# Patient Record
Sex: FEMALE | Race: WHITE | NOT HISPANIC OR LATINO | Employment: OTHER | URBAN - METROPOLITAN AREA
[De-identification: names, ages, dates, MRNs, and addresses within clinical notes are randomized per-mention and may not be internally consistent; named-entity substitution may affect disease eponyms.]

---

## 2024-08-06 PROBLEM — I63.9 ACUTE CEREBROVASCULAR ACCIDENT (CVA) DUE TO ISCHEMIA: Status: ACTIVE | Noted: 2024-08-06

## 2024-08-06 PROBLEM — Z71.89 ACP (ADVANCE CARE PLANNING): Status: ACTIVE | Noted: 2024-08-06

## 2024-08-06 PROBLEM — R29.810 WEAKNESS ON RIGHT SIDE OF FACE: Status: ACTIVE | Noted: 2024-08-06

## 2024-08-07 PROBLEM — Z73.6 LIMITATION OF ACTIVITY DUE TO DISABILITY: Status: ACTIVE | Noted: 2024-08-07

## 2024-08-08 PROBLEM — Z75.8 DISCHARGE PLANNING ISSUES: Status: ACTIVE | Noted: 2024-08-08

## 2024-08-14 ENCOUNTER — TELEPHONE (OUTPATIENT)
Dept: OBSTETRICS AND GYNECOLOGY | Facility: CLINIC | Age: 84
End: 2024-08-14
Payer: MEDICARE

## 2024-08-14 NOTE — TELEPHONE ENCOUNTER
Spoke with daughter Jenny [phone]. Patient just released from the hospital and now in skilled nursing facility. Daughter states will call back at a later date, once she's settled and a little better.

## 2024-10-07 PROBLEM — I10 PRIMARY HYPERTENSION: Status: ACTIVE | Noted: 2024-10-07

## 2024-10-07 PROBLEM — Z73.6 LIMITATION OF ACTIVITY DUE TO DISABILITY: Status: RESOLVED | Noted: 2024-08-07 | Resolved: 2024-10-07

## 2024-10-07 PROBLEM — Z71.89 ACP (ADVANCE CARE PLANNING): Status: RESOLVED | Noted: 2024-08-06 | Resolved: 2024-10-07

## 2024-10-07 PROBLEM — Z75.8 DISCHARGE PLANNING ISSUES: Status: RESOLVED | Noted: 2024-08-08 | Resolved: 2024-10-07

## 2025-01-29 ENCOUNTER — TELEPHONE (OUTPATIENT)
Dept: PAIN MEDICINE | Facility: CLINIC | Age: 85
End: 2025-01-29
Payer: MEDICARE

## 2025-01-29 ENCOUNTER — HOSPITAL ENCOUNTER (OUTPATIENT)
Dept: RADIOLOGY | Facility: HOSPITAL | Age: 85
Discharge: HOME | End: 2025-01-29
Attending: STUDENT IN AN ORGANIZED HEALTH CARE EDUCATION/TRAINING PROGRAM
Payer: MEDICARE

## 2025-01-29 ENCOUNTER — OFFICE VISIT (OUTPATIENT)
Dept: PAIN MEDICINE | Facility: CLINIC | Age: 85
End: 2025-01-29
Payer: MEDICARE

## 2025-01-29 VITALS — WEIGHT: 181 LBS | HEIGHT: 62 IN | BODY MASS INDEX: 33.31 KG/M2

## 2025-01-29 DIAGNOSIS — M54.9 DORSALGIA, UNSPECIFIED: Primary | ICD-10-CM

## 2025-01-29 DIAGNOSIS — M54.9 DORSALGIA, UNSPECIFIED: ICD-10-CM

## 2025-01-29 PROCEDURE — 99213 OFFICE O/P EST LOW 20 MIN: CPT | Mod: PBBFAC,25,PO | Performed by: STUDENT IN AN ORGANIZED HEALTH CARE EDUCATION/TRAINING PROGRAM

## 2025-01-29 PROCEDURE — 99204 OFFICE O/P NEW MOD 45 MIN: CPT | Mod: S$PBB,,, | Performed by: STUDENT IN AN ORGANIZED HEALTH CARE EDUCATION/TRAINING PROGRAM

## 2025-01-29 PROCEDURE — 99999 PR PBB SHADOW E&M-EST. PATIENT-LVL III: CPT | Mod: PBBFAC,,, | Performed by: STUDENT IN AN ORGANIZED HEALTH CARE EDUCATION/TRAINING PROGRAM

## 2025-01-29 PROCEDURE — 72114 X-RAY EXAM L-S SPINE BENDING: CPT | Mod: 26,,, | Performed by: RADIOLOGY

## 2025-01-29 PROCEDURE — 72114 X-RAY EXAM L-S SPINE BENDING: CPT | Mod: TC,PO

## 2025-01-29 NOTE — TELEPHONE ENCOUNTER
----- Message from Osmin sent at 1/29/2025 10:01 AM CST -----  Type:  Sooner Appointment Request    Caller is requesting a sooner appointment.  Caller declined first available appointment listed below.  Caller will not accept being placed on the waitlist and is requesting a message be sent to doctor.    Name of Caller:pts daughterOlga Lidia    When is the first available appointment? 03/12/25    Would the patient rather a call back or a response via [facility]? Call back    Best Call Back Number:[phone]    Additional Information: Pts daughter called and needed time after the MRI was performed to see the Dr for a follow-up. Scheduled and it is now a month later but they would like to come in closer to when they do the MRI which is on 02/14, can tiy accomodate them? Please call back to advise. Thanks!

## 2025-01-29 NOTE — PROGRESS NOTES
[facility] - Department    Brandon Cavazos MD      First Office Visit: 1/29/27  Today' Date: 1/29/2025  Last Office Visit: None    Chief complaint: back pain     HPI: Pt is a very pleasant 84 y.o., who presents for evaluation. Referred by self. Pt seen for back pain for mos. Endorses having back pain that stays in the back.  Endorses having Charley horses of both legs.  Denies having any sharp, shooting pain going down both legs.  No BB changes.  Is doing PT and HEP.           Pain disability index score: 42  Pain score: 5    Relevant Imaging/ Testing: None    Procedures: None    Date of board of pharmacy review:1/29/2025  Date of opioid risk screening/ pain psych: None  Date of opioid agreement and consent: None  Date of urine drug screen: None  Date of random pill count: None     was reviewed today: reviewed, no concerns     Prescribed medications: None    See EHR for  PMH, PSH, FH, SH, Medications and Allergy    ROS:  Positive for pain  ROS     PE:  There were no vitals filed for this visit.  General: Pleasant, no distress  HEENT: NC/ AT. PERRLA  CV: Radial pulses intact  Pulm: No distress  Ext: No edema    Physical Exam     Neuromusculoskeletal:  Head: NC, AT. PERRLA  Neck: Intact range of motions  Shoulder: Intact range of motion  Lumbar: Intact range of motion. Bilat Facet loading. Min Tenderness. Neg SL. No pain with flexion. No pain with extension.   Hip: Intact range of motion. Min tenderness  SI: Level, Min tenderness  Knee: Intact range of motion  Reflexes: normal Knee  Strength: 5/5 globally   Sensory: Grossly intact   Skin: No bruising, erythema  Gait: Normal      Impression:  Back pain  Relevant History  BMI 33.11  Osteoarthritis   DMII (HgbA1c 6.4)    Assessment:  Axial back pain  Lumbar spondylosis     Plan:  Discussed options  Imaging/ relevant records viewed/ reviewed/ discussed  Imaging results viewed and reviewed (noted above)/ reviewed with patient   reviewed  Cont HEP  Cont PT  XR  L-spine  MR L-spine  Re-eval after  B MBB L4-5 and L5-S1       Prescribed medications:  1. None    The impression and plan were discussed and explained in detail. All the questions were answered. Education was provided accordingly.         Follow-up:  3 wks or sooner if needed    Sabi Stone MD

## 2025-02-07 PROBLEM — G93.41 ACUTE METABOLIC ENCEPHALOPATHY: Status: ACTIVE | Noted: 2025-02-07

## 2025-02-07 PROBLEM — N39.0 UTI (URINARY TRACT INFECTION): Status: ACTIVE | Noted: 2025-02-07

## 2025-02-07 PROBLEM — Z86.73 HISTORY OF CVA (CEREBROVASCULAR ACCIDENT): Status: ACTIVE | Noted: 2025-02-07

## 2025-02-07 PROBLEM — E87.6 HYPOKALEMIA: Status: ACTIVE | Noted: 2025-02-07

## 2025-02-07 PROBLEM — Z85.3 HISTORY OF BREAST CANCER: Status: ACTIVE | Noted: 2025-02-07

## 2025-02-07 PROBLEM — D72.829 LEUKOCYTOSIS: Status: ACTIVE | Noted: 2025-02-07

## 2025-02-07 PROBLEM — E83.42 HYPOMAGNESEMIA: Status: ACTIVE | Noted: 2025-02-07

## 2025-02-07 PROBLEM — D64.9 NORMOCYTIC ANEMIA: Status: ACTIVE | Noted: 2025-02-07

## 2025-02-07 PROBLEM — I5A MYOCARDIAL INJURY: Status: ACTIVE | Noted: 2025-02-07

## 2025-02-08 PROBLEM — E53.8 B12 DEFICIENCY: Status: ACTIVE | Noted: 2025-02-08

## 2025-02-08 PROBLEM — B34.8 RHINOVIRUS: Status: ACTIVE | Noted: 2025-02-08

## 2025-02-12 PROBLEM — N30.00 ACUTE CYSTITIS WITHOUT HEMATURIA: Status: ACTIVE | Noted: 2025-02-12

## 2025-02-15 PROBLEM — Z09 HOSPITAL DISCHARGE FOLLOW-UP: Status: ACTIVE | Noted: 2025-02-15

## 2025-02-15 PROBLEM — F41.9 ANXIETY AND DEPRESSION: Status: ACTIVE | Noted: 2025-02-15

## 2025-02-15 PROBLEM — F32.A ANXIETY AND DEPRESSION: Status: ACTIVE | Noted: 2025-02-15

## 2025-02-25 PROBLEM — Z09 HOSPITAL DISCHARGE FOLLOW-UP: Status: RESOLVED | Noted: 2025-02-15 | Resolved: 2025-02-25

## 2025-02-25 PROBLEM — N39.0 UTI (URINARY TRACT INFECTION): Status: RESOLVED | Noted: 2025-02-07 | Resolved: 2025-02-25

## 2025-03-27 PROBLEM — B34.8 RHINOVIRUS: Status: RESOLVED | Noted: 2025-02-08 | Resolved: 2025-03-27

## 2025-03-27 PROBLEM — N30.00 ACUTE CYSTITIS WITHOUT HEMATURIA: Status: RESOLVED | Noted: 2025-02-12 | Resolved: 2025-03-27

## 2025-04-15 PROBLEM — J06.9 UPPER RESPIRATORY TRACT INFECTION: Status: ACTIVE | Noted: 2025-04-15

## 2025-04-15 PROBLEM — J01.00 ACUTE NON-RECURRENT MAXILLARY SINUSITIS: Status: ACTIVE | Noted: 2025-04-15

## 2025-05-14 PROBLEM — R30.0 DYSURIA: Status: ACTIVE | Noted: 2025-05-14

## 2025-07-14 ENCOUNTER — OFFICE VISIT (OUTPATIENT)
Dept: UROGYNECOLOGY | Facility: CLINIC | Age: 85
End: 2025-07-14
Payer: MEDICARE

## 2025-07-14 VITALS — BODY MASS INDEX: 34.39 KG/M2 | HEIGHT: 62 IN

## 2025-07-14 DIAGNOSIS — Z12.4 CERVICAL CANCER SCREENING: ICD-10-CM

## 2025-07-14 DIAGNOSIS — R35.1 NOCTURIA MORE THAN TWICE PER NIGHT: ICD-10-CM

## 2025-07-14 DIAGNOSIS — N39.46 URINARY INCONTINENCE, MIXED: Primary | ICD-10-CM

## 2025-07-14 DIAGNOSIS — N81.4 UTEROVAGINAL PROLAPSE: ICD-10-CM

## 2025-07-14 DIAGNOSIS — N81.11 CYSTOCELE, MIDLINE: ICD-10-CM

## 2025-07-14 DIAGNOSIS — E11.9 TYPE 2 DIABETES MELLITUS WITHOUT COMPLICATION, WITHOUT LONG-TERM CURRENT USE OF INSULIN: ICD-10-CM

## 2025-07-14 DIAGNOSIS — R15.1 FECAL SOILING: ICD-10-CM

## 2025-07-14 DIAGNOSIS — N81.6 RECTOCELE, FEMALE: ICD-10-CM

## 2025-07-14 DIAGNOSIS — R33.9 INCOMPLETE BLADDER EMPTYING: ICD-10-CM

## 2025-07-14 PROCEDURE — 57160 INSERT PESSARY/OTHER DEVICE: CPT | Mod: PBBFAC | Performed by: OBSTETRICS & GYNECOLOGY

## 2025-07-14 PROCEDURE — 88175 CYTOPATH C/V AUTO FLUID REDO: CPT | Mod: TC | Performed by: OBSTETRICS & GYNECOLOGY

## 2025-07-14 PROCEDURE — 99213 OFFICE O/P EST LOW 20 MIN: CPT | Mod: PBBFAC,25 | Performed by: OBSTETRICS & GYNECOLOGY

## 2025-07-14 PROCEDURE — 87624 HPV HI-RISK TYP POOLED RSLT: CPT | Performed by: OBSTETRICS & GYNECOLOGY

## 2025-07-14 PROCEDURE — 87086 URINE CULTURE/COLONY COUNT: CPT | Performed by: OBSTETRICS & GYNECOLOGY

## 2025-07-14 PROCEDURE — 99999 PR PBB SHADOW E&M-EST. PATIENT-LVL III: CPT | Mod: PBBFAC,,, | Performed by: OBSTETRICS & GYNECOLOGY

## 2025-07-14 PROCEDURE — 51701 INSERT BLADDER CATHETER: CPT | Mod: PBBFAC | Performed by: OBSTETRICS & GYNECOLOGY

## 2025-07-14 RX ORDER — ACETAMINOPHEN AND PHENYLEPHRINE HCL 325; 5 MG/1; MG/1
TABLET ORAL
COMMUNITY

## 2025-07-14 NOTE — PROGRESS NOTES
[facility] - UROGYNECOLOGY  [address] [place] [id number]    Naomi Wharton  [id number]  1940  July 14, 2025    CC: Kang in URO  Consulting Physician: Self, Aaareferral   GYN: none  Primary M.D.: Brandon Cavazos MD    Chief Complaint   Patient presents with    Vaginal Prolapse    re occurant uti       HPI:     1)  UI:  (+) LEVI (cough, lifting)--longstanding = (+) UUI (spontaneous--feels like related to inability to empty). (+) pads/diapers (super):4/day, usually severe wetness and 0-5/night usually severe wetness.  Daytime frequency: Q 2 hours.  Nocturia: Yes: 2-4/night--often wakes due to  waking.   (--) dysuria,  (--) hematuria,  ? frequent UTIs (sees Kang U[facility], unclear if he does C&S). Reports 2 UTIs in last 2 years since moving to [place].  Typical symptoms: pain/burning at end of urination. (+) complete bladder emptying. +PV to help with small 2nd volume.     2)  POP:  Present x years. To introitus.  Symptoms:(+)  no symptoms.  (--) vaginal bleeding. (--) vaginal discharge. (--) sexually active.  has Alzheimer's + ED.   (--) h/o dyspareunia.  (+)  Vaginal dryness when took femara for breast CA--stopped femara, which has helped.  (--) vaginal estrogen use. Uses aquaphor PRN.     3)  BM:  ? constipation/straining. Feels like stool gets hung up getting stool out. Uses bidet, which helps. States stool is well-formed. Takes stool softener sometimes.  (--) chronic diarrhea. (--) hematochezia.  (+) fecal incontinence.  (+) fecal smearing/urgency related to constipation--mostly with strong urge.  (--) rectal prolapse.  (--) complete evacuation. Sometimes splints.  Uses bidet to help.     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 Kapadia (was told was benign)    Past Surgical History  Past Surgical History:   Procedure Laterality Date    BREAST SURGERY     --LLQ ovarian cystectomy   --LSC horace  --skin cancer removals, including MOHS  --CK    Hysterectomy: No    Past Ob History    TIUP x 1  SAB x 1  Stillbirth x 1   x 4.  C/s x 0.    Largest infant weight: 9#  yes FAVD. yes episiotomy.      Gynecologic History  LMP: No LMP recorded. Patient is postmenopausal.  Age of menarche: 11 yo  Age of menopause: 49 yo  Menstrual history: h/o menorrhagia  Pap test: reports around  in OR--no results.  History of abnormal paps: Yes - remote, s/p [facility].  History of STIs:  No  Mammogram: Date of last:  per report.  Result: Normal per report  Colonoscopy: Date of last:  per report.  Result:  polyps per report.  Repeat due:   per PCP.    DEXA:  Date of last:  or after.  Result:  osteopenia per report.  Repeat due:  per PCP.     Family History  No family history on file.   Colon CA: Yes, father (older)  Breast CA: No  GYN CA: No   CA: Sister (Bladder, +tob)    Social History  Tobacco Use History[1].    Social History     Substance and Sexual Activity   Alcohol Use Never   .    Social History     Substance and Sexual Activity   Drug Use Never   .  The patient is .  Resides in [place] [id number].  Employment status: currently employed , [employer].     Allergies  Review of patient's allergies indicates:   Allergen Reactions    Lisinopril Anaphylaxis       Medications  Medications Ordered Prior  "to Encounter[2]    Review of Systems A 14 point ROS was reviewed with pertinent positives as noted above in the history of present illness.      Constitutional: negative  Eyes: negative  Endocrine: negative  Gastrointestinal: negative  Cardiovascular: negative  Respiratory: negative  Allergic/Immunologic: negative  Integumentary: negative  Psychiatric: negative  Musculoskeletal: negative   Ear/Nose/Throat: negative  Neurologic: negative  Genitourinary: SEE HPI  Hematologic/Lymphatic: negative   Breast: negative    Urogynecologic Exam  Ht 5' 2" (1.575 m)   BMI 34.39 kg/m²     GENERAL APPEARANCE:  The patient is well-developed, well-nourished.   Neck:  Supple with no thyromegaly, no carotid bruits.  Heart:  Regular rate and rhythm, no murmurs, rubs or gallops.  Lungs:  Clear.  No CVA tenderness.  Abdomen:  Soft, nontender, nondistended, no hepatosplenomegaly.  Incisions:  LLQ + LSC well-healed    PELVIC:    External genitalia:  Normal Bartholins, Skenes and labia bilaterally.    Urethra:  No caruncle, diverticulum or masses.  (+) hypermobility.    Vagina:  Atrophy (+) , no bladder masses or tender, no discharge.    Cervix:  normal appearance  Uterus: normal size, contour, position, consistency, mobility, non-tender  Adnexa: Not palpable.    POP-Q:  Aa 0; Ba 0; C -4; Ap -1; Bp -1; D -7.  Genital hiatus 4, perineal body 3, total vaginal length 9-10.    NEUROLOGIC:  Cranial nerves 2 through 12 intact.  Strength 5/5.  DTRs 2+ lower extremities.  S2 through 4 normal.  Sacral reflexes intact.    EXT: DELGADILLO, 2+ pulses bilaterally, no C/C; +LE edema B trace-1+    COUGH STRESS TEST:  negative  KEGEL: 1 /5    RECTAL:    External:  Normal, (--) hemorrhoids, (--) dovetailing.   Internal:   (--) tenderness, (--) masses, Normal resting tone, Normal active tone. Grossly heme NEG. No rectal prolapse.     PVR: 140 mL    The patient was fit with #3 UI + POP pessary.  She was able to tolerate the device comfortabley with bending, " squatting, valsalva.  She was not able to demonstrate independent removal and placement.  She tolerated the procedure well.        Impression    1. Urinary incontinence, mixed    2. Cervical cancer screening    3. Incomplete bladder emptying    4. Type 2 diabetes mellitus without complication, without long-term current use of insulin    5. Fecal soiling    6. Nocturia more than twice per night    7. Cystocele, midline    8. Rectocele, female    9. Uterovaginal prolapse        Initial Plan  The patient was counseled regarding these issues. The patient was given a summary sheet containing each of these issues with possible options for evaluation and management. When appropriate, we also reviewed computer-generated diagrams specific to their diagnoses..  All questions were addressed to the patient's satisfaction.    1)  Mixed urinary incontinence, urge = stress:    --urine C&S  --trial of pessary  --Empty bladder every 2-3 hours.  Empty well: wait a minute, lean forward on toilet.    --Avoid dietary irritants (see sheet).  Keep diary x 3-5 days to determine your irritants.  --KEGELS: do 10 in AM and 10 in PM, holding each x 10 seconds.  When you feel urge to go, STOP, KEGEL, and when urge has passed, then go to bathroom.  Consider PT in future.    --URGE: consider medication in future. Takes 2-4 weeks to see if will have effect.  For dry mouth: get sour, sugar free lozenge or gum.    --STRESS:  Pessary vs. Sling vs bulking.   --trial of pessary    2)  Stage 2 cystocele/rectocele, stage 1 uterine prolapse:  --discussed   --trial of pessary: #3 UI + POP pessary    --if surgery in future: vaginal hysterectomy + uterosacral suspension + anterior/posterior repair    --RTC for pessary placement and checking PVR; then schedule Q3-4 month pessary checks    3)  History of UTIs:  --urine C&S sent today  --If you feel like you have a UTI:     --During the work week: call PCP or go to nearest [facility]   --After hours or  on weekends: go to nearest [facility]    --These facilities can check your urine for infection, send a urine culture to verify presence/absence of infection, and start treatment if needed.    --After you are evaluated, please send a message through [facility] or call your urogynecology provider's office to let them know so that they can follow trends.  --follow UTI prevention tips (see attached)  --work on emptying bladder well:  --empty bladder every 2-3 hours.  Empty well: wait a minute, lean forward on toilet.    --prolapse present: YES   --PVR today: 140 mL   --trial of pessary  --control bowel movements/fecal cross-contamination  --treat vaginal atrophy (dryness)  --empty bladder before and after intercourse  --consider taking daily combination pill: cranberry + D-mannose (3119-1716 mg) + probiotic    --look on [employer] or in drug/grocery store for any brand that has these components   --examples of brands: Biophix, Now, AZO  --consider need for further evaluation ( tract imaging, cystoscopy) if issue persists  --last  tract imaging: OR 2020 or after  --last cystoscopy: OR 2020 or after; renal US ordered    4)  Nocturia (nighttime urination):   --stop fluids 2 hours before bed.    --no water by bed  --If have leg swelling:  Elevate feet above chest x 1 hour before bed to get excess fluid off.  Can also use support hose (knee highs).      5)  Fecal urgency/smearing vs incontinence:  --work on avoiding foods that trigger loose stool  --work on bulking stool and finding a place where stool is not too hard or loose  --Start daily fiber.  Take 1 tsp of fiber powder (psyllium or other sugar-free powder).  Mix in 8 oz of water.  Take x 3-5 days.  Then, increase fiber by 1 tsp every 3-5 days until stool is easy to pass, fewer accidents.  Stop and continue at that dose.   Do not exceed 6 tsps/day.    --for constipation: May also use over the counter stool softener 1-2 x/day.    --AVOID laxatives.    6)   Well-woman:  --pap/HPV done today  --continue breast screening per [facility]  --talk with Dirk about bone density and colon cancer screening    7)  Incomplete bladder emptying:  --urine C&S  --renal US  --most likely from prolapse: trial of pessary  --RTC for pessary placement and checking PVR; then schedule Q3-4 month pessary checks  --if PVR not improved, consider need for UDS    8)  H/o benign bladder lesion:  --follow up with Dr. Perez    9)  Vaginal atrophy (dryness):    --h/o E2 sens breast CA  --coconut oil: dime-sized amount with finger (as far as can reach internally) and around the vaginal opening and inner lips EVERY NIGHT.  --will help you tolerate pessary better    10)  RTC for pessary placement and checking PVR; then schedule Q3-4 month pessary checks.  Once ok, can schedule follow up in [place].     Thank you for requesting consultation of your patient.  I look forward to participating in their care.    I spent a total of 60 minutes on the day of the visit. This includes face to face time and non-face to face time preparing to see the patient (eg, review of tests), obtaining and/or reviewing separately obtained history, documenting clinical information in the electronic or other health record, independently interpreting results and communicating results to the patient/family/caregiver, or care coordinator. This time is separate and distinct from the procedure(s) performed.      Visit complexity today is associated with medical care services that are part of the ongoing care related to the single serious and/or complex condition of prolapse, mixed incontinence, incomplete bladder emptying. A longitudinal relationship exists or is being developed between the patient and this practitioner for the care of this condition.     Dl Muñoz  Female Pelvic Medicine and Reconstructive Surgery  [facility] [place], [place]           [1]   Social History  Tobacco Use   Smoking Status Never    Smokeless Tobacco Never   [2]   Current Outpatient Medications on File Prior to Visit   Medication Sig Dispense Refill    antiox #8/om3/dha/epa/lut/zeax (PRESERVISION AREDS 2, OMEGA-3, ORAL) Take 100 mg by mouth 2 (two) times a day. Eye health      aspirin (ECOTRIN) 81 MG EC tablet Take 81 mg by mouth every evening.      atorvastatin (LIPITOR) 80 MG tablet TAKE 1 TABLET EVERY EVENING 90 tablet 3    clotrimazole-betamethasone 1-0.05% (LOTRISONE) cream Apply topically 2 (two) times daily. 45 g 2    CRANBERRY EXTRACT-D-MANNOSE ORAL Take 1 capsule by mouth once daily.      furosemide (LASIX) 20 MG tablet TAKE 1 TABLET DAILY 90 tablet 3    levothyroxine (SYNTHROID) 75 MCG tablet TAKE 1 TABLET BEFORE BREAKFAST 90 tablet 3    losartan (COZAAR) 100 MG tablet TAKE 1 TABLET DAILY 90 tablet 3    traMADoL (ULTRAM) 50 mg tablet Take 50 mg by mouth every 6 (six) hours as needed for Pain.      cyanocobalamin, vitamin B-12, 500 mcg Chew       sulfamethoxazole-trimethoprim 800-160mg (BACTRIM DS) 800-160 mg Tab Take 1 tablet by mouth 2 (two) times daily. (Patient not taking: Reported on 7/14/2025) 14 tablet 0     No current facility-administered medications on file prior to visit.

## 2025-07-14 NOTE — PATIENT INSTRUCTIONS
1)  Mixed urinary incontinence, urge = stress:    --urine C&S  --trial of pessary  --Empty bladder every 2-3 hours.  Empty well: wait a minute, lean forward on toilet.    --Avoid dietary irritants (see sheet).  Keep diary x 3-5 days to determine your irritants.  --KEGELS: do 10 in AM and 10 in PM, holding each x 10 seconds.  When you feel urge to go, STOP, KEGEL, and when urge has passed, then go to bathroom.  Consider PT in future.    --URGE: consider medication in future. Takes 2-4 weeks to see if will have effect.  For dry mouth: get sour, sugar free lozenge or gum.    --STRESS:  Pessary vs. Sling vs bulking.   --trial of pessary    2)  Stage 2 cystocele/rectocele, stage 1 uterine prolapse:  --discussed   --trial of pessary: #3 UI + POP pessary    --if surgery in future: vaginal hysterectomy + uterosacral suspension + anterior/posterior repair    --RTC for pessary placement and checking PVR; then schedule Q3-4 month pessary checks    3)  History of UTIs:  --urine C&S sent today  --If you feel like you have a UTI:     --During the work week: call PCP or go to nearest [facility]   --After hours or on weekends: go to nearest [facility]    --These facilities can check your urine for infection, send a urine culture to verify presence/absence of infection, and start treatment if needed.    --After you are evaluated, please send a message through [facility] or call your urogynecology provider's office to let them know so that they can follow trends.  --follow UTI prevention tips (see attached)  --work on emptying bladder well:  --empty bladder every 2-3 hours.  Empty well: wait a minute, lean forward on toilet.    --prolapse present: YES   --PVR today: 140 mL   --trial of pessary  --control bowel movements/fecal cross-contamination  --treat vaginal atrophy (dryness)  --empty bladder before and after intercourse  --consider taking daily combination pill: cranberry + D-mannose (9866-9353 mg) +  probiotic    --look on [employer] or in drug/grocery store for any brand that has these components   --examples of brands: Biophix, Now, AZO  --consider need for further evaluation ( tract imaging, cystoscopy) if issue persists  --last  tract imaging: OR 2020 or after  --last cystoscopy: OR 2020 or after; renal US ordered    4)  Nocturia (nighttime urination):   --stop fluids 2 hours before bed.    --no water by bed  --If have leg swelling:  Elevate feet above chest x 1 hour before bed to get excess fluid off.  Can also use support hose (knee highs).      5)  Fecal urgency/smearing vs incontinence:  --work on avoiding foods that trigger loose stool  --work on bulking stool and finding a place where stool is not too hard or loose  --Start daily fiber.  Take 1 tsp of fiber powder (psyllium or other sugar-free powder).  Mix in 8 oz of water.  Take x 3-5 days.  Then, increase fiber by 1 tsp every 3-5 days until stool is easy to pass, fewer accidents.  Stop and continue at that dose.   Do not exceed 6 tsps/day.    --for constipation: May also use over the counter stool softener 1-2 x/day.    --AVOID laxatives.    6)  Well-woman:  --pap/HPV done today  --continue breast screening per [facility]  --talk with Dirk about bone density and colon cancer screening    7)  Incomplete bladder emptying:  --urine C&S  --renal US  --most likely from prolapse: trial of pessary  --RTC for pessary placement and checking PVR; then schedule Q3-4 month pessary checks  --if PVR not improved, consider need for UDS    8)  H/o benign bladder lesion:  --follow up with Dr. Perez    9)  Vaginal atrophy (dryness):    --h/o E2 sens breast CA  --coconut oil: dime-sized amount with finger (as far as can reach internally) and around the vaginal opening and inner lips EVERY NIGHT.  --will help you tolerate pessary better    10)  RTC for pessary placement and checking PVR; then schedule Q3-4 month pessary checks

## 2025-07-16 LAB — BACTERIA UR CULT: NO GROWTH

## 2025-07-17 LAB
INSULIN SERPL-ACNC: NORMAL U[IU]/ML
LAB AP BETHESDA CATEGORY: NORMAL
LAB AP CLINICAL FINDINGS: NORMAL
LAB AP CONTRACEPTIVES: NORMAL
LAB AP GYN ADDITIONAL FINDINGS: NORMAL
LAB AP LMP DATE: NORMAL
LAB AP OCHS PAP SPECIMEN ADEQUACY: NORMAL
LAB AP OHS PAP INTERPRETATION: NORMAL
LAB AP PAP DISCLAIMER COMMENTS: NORMAL
LAB AP PAP ESTROGEN REPLACEMENT THERAPY: NORMAL
LAB AP PAP PMP: NORMAL
LAB AP PAP PREVIOUS BX: NORMAL
LAB AP PAP PRIOR TREATMENT: NORMAL
LAB AP PERFORMING LOCATION(S): NORMAL

## 2025-07-18 LAB
HPV DNA, HIGH RISK TYPE 16, PCR (OHS): NEGATIVE
HPV DNA, HIGH RISK TYPE 18, PCR (OHS): NEGATIVE
HPV DNA, HIGH RISK TYPE OTHER, PCR (OHS): NEGATIVE

## 2025-07-24 ENCOUNTER — TELEPHONE (OUTPATIENT)
Dept: UROGYNECOLOGY | Facility: CLINIC | Age: 85
End: 2025-07-24
Payer: MEDICARE

## 2025-07-24 NOTE — TELEPHONE ENCOUNTER
Call to schedule appointment for pessary insertion.  Spoke to patient's daughter, Olga Lidia.  Scheduled at [facility] On 7/30/2025 at 9:30 am.  Olga Lidia agreed to date, time and place.  Call ended.

## 2025-07-29 NOTE — PROGRESS NOTES
Urogyn follow up  07/30/2025  .  [facility]  [address]  [facility] [id number]    Naomi Wharton  [id number]  1940      Naomi Wharton is a 84 y.o.  here for a urogyn follow up for pessary insertion    Last HPI from 07/14/2025    1)  UI:  (+) LEVI (cough, lifting)--longstanding = (+) UUI (spontaneous--feels like related to inability to empty). (+) pads/diapers (super):4/day, usually severe wetness and 0-5/night usually severe wetness.  Daytime frequency: Q 2 hours.  Nocturia: Yes: 2-4/night--often wakes due to  waking.   (--) dysuria,  (--) hematuria,  ? frequent UTIs (sees Kang [facility], unclear if he does C&S). Reports 2 UTIs in last 2 years since moving to [place].  Typical symptoms: pain/burning at end of urination. (+) complete bladder emptying. +PV to help with small 2nd volume.      2)  POP:  Present x years. To introitus.  Symptoms:(+)  no symptoms.  (--) vaginal bleeding. (--) vaginal discharge. (--) sexually active.  has Alzheimer's + ED.   (--) h/o dyspareunia.  (+)  Vaginal dryness when took femara for breast CA--stopped femara, which has helped.  (--) vaginal estrogen use. Uses aquaphor PRN.    POP-Q:  Aa 0; Ba 0; C -4; Ap -1; Bp -1; D -7.  Genital hiatus 4, perineal body 3, total vaginal length 9-10.   pvr 140 mL   fit with #3 UI + POP pessary.    3)  BM:  ? constipation/straining. Feels like stool gets hung up getting stool out. Uses bidet, which helps. States stool is well-formed. Takes stool softener sometimes.  (--) chronic diarrhea. (--) hematochezia.  (+) fecal incontinence.  (+) fecal smearing/urgency related to constipation--mostly with strong urge.  (--) rectal prolapse.  (--) complete evacuation. Sometimes splints.  Uses bidet to help.         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    Social History     Socioeconomic History    Marital status:    Tobacco Use    Smoking status: Never    Smokeless tobacco: Never   Substance and Sexual Activity    Alcohol use: Never    Drug use: Never     Social Drivers of Health     Financial Resource Strain: Low Risk  (2/9/2025)    Overall Financial Resource Strain (CARDIA)     Difficulty of Paying Living Expenses: Not hard at all   Food Insecurity: No Food Insecurity (2/9/2025)    Hunger Vital Sign     Worried About Running Out of Food in the Last Year: Never true     Ran Out of Food in the Last Year: Never true   Transportation Needs: No Transportation Needs (3/14/2025)    OASIS : Transportation     Lack of Transportation (Medical): No     Lack of Transportation (Non-Medical): No     Patient Unable or Declines to Respond: No   Housing Stability: Unknown (2/9/2025)    Housing Stability Vital Sign     Unable to Pay for Housing in the Last Year: No     Homeless in the Last Year: No       Current Medications[1]    Review of patient's allergies indicates:   Allergen Reactions    Lisinopril Anaphylaxis       Well woman:  Pap test: 07/25/2025 normal hpv negative.  History of abnormal paps: Yes - remote, s/p [facility].  History of STIs:  No  Mammogram: Date of last: 2024 per report.  Result: Normal per report   Colonoscopy: Date of last: 2020 per report.  Result:  polyps per report.  " Repeat due:   per PCP.    DEXA:  Date of last: 2020 or after.  Result:  osteopenia per report.  Repeat due:  per PCP.        ROS:  As per HPI.      Exam  BP (!) 152/70 (BP Location: Left arm, Patient Position: Sitting)   Pulse 82   Ht 5' 2" (1.575 m)   Wt 86.6 kg (190 lb 14.7 oz)   BMI 34.92 kg/m²   General: alert and oriented, no acute distress  Respiratory: normal respiratory effort  Abd: soft, non-tender, non-distended    Pelvic  Ext. Genitalia: normal external genitalia. Normal bartholin's and skeens glands  Vagina: + atrophy. Normal vaginal mucosa without lesions. No discharge noted.   Non-tender bladder base without palpable mass.  Cervix: no lesions  Uterus:  uterus is normal size, shape, consistency and nontender   Urethra: no masses or tenderness  Urethral meatus: no lesions, caruncle or prolapse.    #3 ring with support with knob inserted    Cath urine specimen today--pvr 60 mL with pessary in place    Impression  1. Urinary incontinence, mixed  Urine Culture High Risk ($$)      2. Incomplete bladder emptying        3. Cystocele, midline        4. Rectocele, female        5. Uterovaginal prolapse          We reviewed the above issues and discussed options for short-term versus long-term management of her problems.   Plan:   --urine C&S  --trial of pessary  --Empty bladder every 2-3 hours.  Empty well: wait a minute, lean forward on toilet.    --Avoid dietary irritants (see sheet).  Keep diary x 3-5 days to determine your irritants.  --KEGELS: do 10 in AM and 10 in PM, holding each x 10 seconds.  When you feel urge to go, STOP, KEGEL, and when urge has passed, then go to bathroom.  Consider PT in future.    --URGE: consider medication in future. Takes 2-4 weeks to see if will have effect.  For dry mouth: get sour, sugar free lozenge or gum.    --STRESS:  Pessary vs. Sling vs bulking.              --trial of pessary     2)  Stage 2 cystocele/rectocele, stage 1 uterine prolapse:  --discussed   --trial of " pessary: #3 UI + POP pessary     --if surgery in future: vaginal hysterectomy + uterosacral suspension + anterior/posterior repair     --RTC for pessary placement and checking PVR; then schedule Q3-4 month pessary checks     3)  History of UTIs:  --urine C&S sent today  --If you feel like you have a UTI:                --During the work week: call PCP or go to nearest [facility]              --After hours or on weekends: go to nearest [facility]                          --These facilities can check your urine for infection, send a urine culture to verify presence/absence of infection, and start treatment if needed.               --After you are evaluated, please send a message through [facility] portal or call your urogynecology provider's office to let them know so that they can follow trends.  --follow UTI prevention tips (see attached)  --work on emptying bladder well:  --empty bladder every 2-3 hours.  Empty well: wait a minute, lean forward on toilet.    --prolapse present: YES              --PVR today: 20 mL with pessary in place              --trial of pessary  --control bowel movements/fecal cross-contamination  --treat vaginal atrophy (dryness)  --empty bladder before and after intercourse  --consider taking daily combination pill: cranberry + D-mannose (7074-2275 mg) + probiotic               --look on Amazon or in drug/grocery store for any brand that has these components              --examples of brands: Biophix, Now, AZO  --consider need for further evaluation ( tract imaging, cystoscopy) if issue persists  --last  tract imaging: OR 2020 or after  --last cystoscopy: OR 2020 or after; renal US ordered     4)  Nocturia (nighttime urination):   --stop fluids 2 hours before bed.    --no water by bed  --If have leg swelling:  Elevate feet above chest x 1 hour before bed to get excess fluid off.  Can also use support hose (knee highs).      5)  Fecal urgency/smearing vs  incontinence:  --work on avoiding foods that trigger loose stool  --work on bulking stool and finding a place where stool is not too hard or loose  --Start daily fiber.  Take 1 tsp of fiber powder (psyllium or other sugar-free powder).  Mix in 8 oz of water.  Take x 3-5 days.  Then, increase fiber by 1 tsp every 3-5 days until stool is easy to pass, fewer accidents.  Stop and continue at that dose.   Do not exceed 6 tsps/day.    --for constipation: May also use over the counter stool softener 1-2 x/day.    --AVOID laxatives.     6)  Well-woman:  --pap/HPV done normal  --continue breast screening per [facility]  --talk with [facility] about bone density and colon cancer screening     7)  Incomplete bladder emptying:  --urine C&S  --renal US done at Dr. Kapadia's office  --most likely from prolapse: trial of pessary  --RTC for pessary placement and checking PVR; then schedule Q3-4 month pessary checks  --if PVR not improved, consider need for UDS     8)  H/o benign bladder lesion:  --follow up with Dr. Perez     9)  Vaginal atrophy (dryness):    --h/o E2 sens breast CA  --coconut oil: dime-sized amount with finger (as far as can reach internally) and around the vaginal opening and inner lips EVERY NIGHT.  --will help you tolerate pessary better     10)  RTC for pessary check-- will have followups in [place] thereafter      I spent a total of 20 minutes on the day of the visit.  This includes face to face time and non-face to face time preparing to see the patient (eg, review of tests), obtaining and/or reviewing separately obtained history, documenting clinical information in the electronic or other health record, independently interpreting results and communicating results to the patient/family/caregiver, or care coordinator.      Maria A Daley, HOANG-BC  [facility]  Division of Female Pelvic Medicine and Reconstructive Surgery  Department of Obstetrics & Gynecology         [1]   Current Outpatient  Medications   Medication Sig Dispense Refill    antiox #8/om3/dha/epa/lut/zeax (PRESERVISION AREDS 2, OMEGA-3, ORAL) Take 100 mg by mouth 2 (two) times a day. [facility]      aspirin (ECOTRIN) 81 MG EC tablet Take 81 mg by mouth every evening.      atorvastatin (LIPITOR) 80 MG tablet TAKE 1 TABLET EVERY EVENING 90 tablet 3    clotrimazole-betamethasone 1-0.05% (LOTRISONE) cream Apply topically 2 (two) times daily. 45 g 2    CRANBERRY EXTRACT-D-MANNOSE ORAL Take 1 capsule by mouth once daily.      cyanocobalamin, vitamin B-12, 500 mcg Chew       furosemide (LASIX) 20 MG tablet TAKE 1 TABLET DAILY 90 tablet 3    levothyroxine (SYNTHROID) 75 MCG tablet TAKE 1 TABLET BEFORE BREAKFAST 90 tablet 3    losartan (COZAAR) 100 MG tablet TAKE 1 TABLET DAILY 90 tablet 3    traMADoL (ULTRAM) 50 mg tablet Take 50 mg by mouth every 6 (six) hours as needed for Pain.      cephALEXin (KEFLEX) 500 MG capsule Take 1 capsule (500 mg total) by mouth 2 (two) times daily. for 7 days 14 capsule 0     No current facility-administered medications for this visit.

## 2025-07-30 ENCOUNTER — OFFICE VISIT (OUTPATIENT)
Dept: UROGYNECOLOGY | Facility: CLINIC | Age: 85
End: 2025-07-30
Payer: MEDICARE

## 2025-07-30 VITALS
SYSTOLIC BLOOD PRESSURE: 152 MMHG | BODY MASS INDEX: 35.14 KG/M2 | WEIGHT: 190.94 LBS | HEIGHT: 62 IN | HEART RATE: 82 BPM | DIASTOLIC BLOOD PRESSURE: 70 MMHG

## 2025-07-30 DIAGNOSIS — N81.4 UTEROVAGINAL PROLAPSE: ICD-10-CM

## 2025-07-30 DIAGNOSIS — N39.46 URINARY INCONTINENCE, MIXED: Primary | ICD-10-CM

## 2025-07-30 DIAGNOSIS — N81.6 RECTOCELE, FEMALE: ICD-10-CM

## 2025-07-30 DIAGNOSIS — R33.9 INCOMPLETE BLADDER EMPTYING: ICD-10-CM

## 2025-07-30 DIAGNOSIS — N81.11 CYSTOCELE, MIDLINE: ICD-10-CM

## 2025-07-30 PROCEDURE — 99999 PR PBB SHADOW E&M-EST. PATIENT-LVL IV: CPT | Mod: PBBFAC,,, | Performed by: NURSE PRACTITIONER

## 2025-07-30 PROCEDURE — 87077 CULTURE AEROBIC IDENTIFY: CPT | Performed by: NURSE PRACTITIONER

## 2025-07-30 PROCEDURE — 99214 OFFICE O/P EST MOD 30 MIN: CPT | Mod: PBBFAC,25 | Performed by: NURSE PRACTITIONER

## 2025-07-30 PROCEDURE — 51701 INSERT BLADDER CATHETER: CPT | Mod: PBBFAC | Performed by: NURSE PRACTITIONER

## 2025-07-30 NOTE — PATIENT INSTRUCTIONS
--urine C&S  --trial of pessary  --Empty bladder every 2-3 hours.  Empty well: wait a minute, lean forward on toilet.    --Avoid dietary irritants (see sheet).  Keep diary x 3-5 days to determine your irritants.  --KEGELS: do 10 in AM and 10 in PM, holding each x 10 seconds.  When you feel urge to go, STOP, KEGEL, and when urge has passed, then go to bathroom.  Consider PT in future.    --URGE: consider medication in future. Takes 2-4 weeks to see if will have effect.  For dry mouth: get sour, sugar free lozenge or gum.    --STRESS:  Pessary vs. Sling vs bulking.              --trial of pessary     2)  Stage 2 cystocele/rectocele, stage 1 uterine prolapse:  --discussed   --trial of pessary: #3 UI + POP pessary     --if surgery in future: vaginal hysterectomy + uterosacral suspension + anterior/posterior repair     --RTC for pessary placement and checking PVR; then schedule Q3-4 month pessary checks     3)  History of UTIs:  --urine C&S sent today  --If you feel like you have a UTI:                --During the work week: call PCP or go to nearest [facility]              --After hours or on weekends: go to nearest [facility]                          --These facilities can check your urine for infection, send a urine culture to verify presence/absence of infection, and start treatment if needed.               --After you are evaluated, please send a message through [facility] portal or call your urogynecology provider's office to let them know so that they can follow trends.  --follow UTI prevention tips (see attached)  --work on emptying bladder well:  --empty bladder every 2-3 hours.  Empty well: wait a minute, lean forward on toilet.    --prolapse present: YES              --PVR today: 140 mL              --trial of pessary  --control bowel movements/fecal cross-contamination  --treat vaginal atrophy (dryness)  --empty bladder before and after intercourse  --consider taking daily combination pill:  cranberry + D-mannose (0663-0172 mg) + probiotic               --look on [employer] or in drug/grocery store for any brand that has these components              --examples of brands: Biophix, Now, AZO  --consider need for further evaluation ( tract imaging, cystoscopy) if issue persists  --last  tract imaging: OR 2020 or after  --last cystoscopy: OR 2020 or after; renal US ordered     4)  Nocturia (nighttime urination):   --stop fluids 2 hours before bed.    --no water by bed  --If have leg swelling:  Elevate feet above chest x 1 hour before bed to get excess fluid off.  Can also use support hose (knee highs).      5)  Fecal urgency/smearing vs incontinence:  --work on avoiding foods that trigger loose stool  --work on bulking stool and finding a place where stool is not too hard or loose  --Start daily fiber.  Take 1 tsp of fiber powder (psyllium or other sugar-free powder).  Mix in 8 oz of water.  Take x 3-5 days.  Then, increase fiber by 1 tsp every 3-5 days until stool is easy to pass, fewer accidents.  Stop and continue at that dose.   Do not exceed 6 tsps/day.    --for constipation: May also use over the counter stool softener 1-2 x/day.    --AVOID laxatives.     6)  Well-woman:  --pap/HPV done today  --continue breast screening per [facility]  --talk with [facility] about bone density and colon cancer screening     7)  Incomplete bladder emptying:  --urine C&S  --renal US  --most likely from prolapse: trial of pessary  --RTC for pessary placement and checking PVR; then schedule Q3-4 month pessary checks  --if PVR not improved, consider need for UDS     8)  H/o benign bladder lesion:  --follow up with Dr. Perez     9)  Vaginal atrophy (dryness):    --h/o E2 sens breast CA  --coconut oil: dime-sized amount with finger (as far as can reach internally) and around the vaginal opening and inner lips EVERY NIGHT.  --will help you tolerate pessary better     10)  RTC for pessary placement and checking PVR; then  schedule Q3-4 month pessary checks.  Once ok, can schedule follow up in [place].

## 2025-08-02 LAB — BACTERIA UR CULT: ABNORMAL

## 2025-08-03 ENCOUNTER — TELEPHONE (OUTPATIENT)
Dept: UROGYNECOLOGY | Facility: CLINIC | Age: 85
End: 2025-08-03
Payer: MEDICARE

## 2025-08-03 DIAGNOSIS — N30.00 ACUTE CYSTITIS WITHOUT HEMATURIA: Primary | ICD-10-CM

## 2025-08-03 RX ORDER — CEPHALEXIN 500 MG/1
500 CAPSULE ORAL 2 TIMES DAILY
Qty: 14 CAPSULE | Refills: 0 | Status: SHIPPED | OUTPATIENT
Start: 2025-08-03 | End: 2025-08-10

## 2025-08-19 ENCOUNTER — TELEPHONE (OUTPATIENT)
Dept: UROGYNECOLOGY | Facility: CLINIC | Age: 85
End: 2025-08-19
Payer: MEDICARE

## 2025-08-20 ENCOUNTER — OFFICE VISIT (OUTPATIENT)
Dept: UROGYNECOLOGY | Facility: CLINIC | Age: 85
End: 2025-08-20
Payer: MEDICARE

## 2025-08-20 VITALS
BODY MASS INDEX: 35.09 KG/M2 | HEART RATE: 86 BPM | WEIGHT: 190.69 LBS | SYSTOLIC BLOOD PRESSURE: 154 MMHG | DIASTOLIC BLOOD PRESSURE: 80 MMHG | HEIGHT: 62 IN

## 2025-08-20 DIAGNOSIS — R33.9 INCOMPLETE BLADDER EMPTYING: ICD-10-CM

## 2025-08-20 DIAGNOSIS — N81.6 RECTOCELE, FEMALE: ICD-10-CM

## 2025-08-20 DIAGNOSIS — N81.4 UTEROVAGINAL PROLAPSE: ICD-10-CM

## 2025-08-20 DIAGNOSIS — N81.11 CYSTOCELE, MIDLINE: ICD-10-CM

## 2025-08-20 DIAGNOSIS — N39.46 URINARY INCONTINENCE, MIXED: Primary | ICD-10-CM

## 2025-08-20 PROCEDURE — 99214 OFFICE O/P EST MOD 30 MIN: CPT | Mod: PBBFAC | Performed by: NURSE PRACTITIONER

## 2025-08-20 PROCEDURE — 99999 PR PBB SHADOW E&M-EST. PATIENT-LVL IV: CPT | Mod: PBBFAC,,, | Performed by: NURSE PRACTITIONER
